# Patient Record
Sex: FEMALE
[De-identification: names, ages, dates, MRNs, and addresses within clinical notes are randomized per-mention and may not be internally consistent; named-entity substitution may affect disease eponyms.]

---

## 2019-10-30 ENCOUNTER — HOSPITAL ENCOUNTER (OUTPATIENT)
Dept: HOSPITAL 76 - LAB.WCP | Age: 44
Discharge: HOME | End: 2019-10-30
Attending: NURSE PRACTITIONER
Payer: COMMERCIAL

## 2019-10-30 DIAGNOSIS — R60.9: Primary | ICD-10-CM

## 2019-10-30 LAB
ALBUMIN DIAFP-MCNC: 3.7 G/DL (ref 3.2–5.5)
ALBUMIN/GLOB SERPL: 1.1 {RATIO} (ref 1–2.2)
ALP SERPL-CCNC: 51 IU/L (ref 42–121)
ALT SERPL W P-5'-P-CCNC: 14 IU/L (ref 10–60)
ANION GAP SERPL CALCULATED.4IONS-SCNC: 8 MMOL/L (ref 6–13)
AST SERPL W P-5'-P-CCNC: 18 IU/L (ref 10–42)
BASOPHILS NFR BLD AUTO: 0.1 10^3/UL (ref 0–0.1)
BASOPHILS NFR BLD AUTO: 1 %
BILIRUB BLD-MCNC: 0.5 MG/DL (ref 0.2–1)
BUN SERPL-MCNC: 7 MG/DL (ref 6–20)
CALCIUM UR-MCNC: 8.6 MG/DL (ref 8.5–10.3)
CHLORIDE SERPL-SCNC: 106 MMOL/L (ref 101–111)
CO2 SERPL-SCNC: 25 MMOL/L (ref 21–32)
CREAT SERPLBLD-SCNC: 0.8 MG/DL (ref 0.4–1)
EOSINOPHIL # BLD AUTO: 0.1 10^3/UL (ref 0–0.7)
EOSINOPHIL NFR BLD AUTO: 2 %
ERYTHROCYTE [DISTWIDTH] IN BLOOD BY AUTOMATED COUNT: 19.4 % (ref 12–15)
EST. AVERAGE GLUCOSE BLD GHB EST-MCNC: 114 MG/DL (ref 70–100)
GFRSERPLBLD MDRD-ARVRAT: 78 ML/MIN/{1.73_M2} (ref 89–?)
GLOBULIN SER-MCNC: 3.3 G/DL (ref 2.1–4.2)
GLUCOSE SERPL-MCNC: 81 MG/DL (ref 70–100)
HB2 TOTAL: 11.1 G/DL
HBA1C BLD-MCNC: 0.42 G/DL
HEMOGLOBIN A1C %: 5.6 % (ref 4.6–6.2)
HGB UR QL STRIP: 10.4 G/DL (ref 12–16)
LYMPHOCYTES # SPEC AUTO: 2.4 10^3/UL (ref 1.5–3.5)
LYMPHOCYTES NFR BLD AUTO: 49.7 %
MCH RBC QN AUTO: 23.1 PG (ref 27–31)
MCHC RBC AUTO-ENTMCNC: 29.5 G/DL (ref 32–36)
MCV RBC AUTO: 78.2 FL (ref 81–99)
MONOCYTES # BLD AUTO: 0.3 10^3/UL (ref 0–1)
MONOCYTES NFR BLD AUTO: 5.1 %
NEUTROPHILS # BLD AUTO: 2.1 10^3/UL (ref 1.5–6.6)
NEUTROPHILS # SNV AUTO: 4.9 X10^3/UL (ref 4.8–10.8)
NEUTROPHILS NFR BLD AUTO: 42 %
PDW BLD AUTO: 10 FL (ref 7.9–10.8)
PLATELET # BLD: 377 10^3/UL (ref 130–450)
PROT SPEC-MCNC: 7 G/DL (ref 6.7–8.2)
RBC MAR: 4.5 10^6/UL (ref 4.2–5.4)
SODIUM SERPLBLD-SCNC: 139 MMOL/L (ref 135–145)

## 2019-10-30 PROCEDURE — 80053 COMPREHEN METABOLIC PANEL: CPT

## 2019-10-30 PROCEDURE — 85025 COMPLETE CBC W/AUTO DIFF WBC: CPT

## 2019-10-30 PROCEDURE — 36415 COLL VENOUS BLD VENIPUNCTURE: CPT

## 2019-10-30 PROCEDURE — 83036 HEMOGLOBIN GLYCOSYLATED A1C: CPT

## 2019-10-31 ENCOUNTER — HOSPITAL ENCOUNTER (OUTPATIENT)
Dept: HOSPITAL 76 - DI.N | Age: 44
Discharge: HOME | End: 2019-10-31
Attending: GENERAL ACUTE CARE HOSPITAL
Payer: COMMERCIAL

## 2019-10-31 DIAGNOSIS — Z12.31: Primary | ICD-10-CM

## 2019-10-31 PROCEDURE — 77067 SCR MAMMO BI INCL CAD: CPT

## 2019-11-08 NOTE — MAMMOGRAPHY REPORT
Reason:  ROUTINE MAMMO

Procedure Date:  10/31/2019   

Accession Number:  [id number] / [id number]                    

Procedure:  MGN - Screening Mammo Dig Bilat CPT Code:  

 

***Final Report***

 

 

FULL RESULT:

 

 

EXAM: Screening Mammo Dig Bilat

 

DATE: 10/31/2019 3:40 PM

 

CLINICAL HISTORY:  Screening encounter. History of early menses.

 

TECHNIQUE: (B) - Bilateral  CC and MLO views were obtained.

 

COMPARISON: 4/5/2018.

 

PARENCHYMAL PATTERN: (A) - The breast(s) demonstrate(s) scattered 

fibroglandular densities.

 

FINDINGS:

There are coarse typically benign calcifications. There are no suspicious 

masses, calcifications, or areas of distortion.

 

IMPRESSION: Benign findings. BI-RADS category 2.

 

RECOMMENDATION: (ANNUAL)  - Recommend routine annual screening 

mammography.

 

BI-RADS CATEGORY: (2) - Benign Findings.

 

STANDARD QUALIFYING STATEMENTS:

1.  This examination was not reviewed with the aid of Computer-Aided 

Detection (CAD).

2.  A negative or benign  imaging report should not preclude biopsy if 

clinically suspicious findings are present.

3.  Dense breasts may obscure an underlying neoplasm.

4. This examination was reviewed without the aid of 3D breast imaging 

(tomosynthesis).

## 2019-11-10 ENCOUNTER — HOSPITAL ENCOUNTER (OUTPATIENT)
Dept: HOSPITAL 76 - DI | Age: 44
Discharge: HOME | End: 2019-11-10
Attending: NURSE PRACTITIONER
Payer: COMMERCIAL

## 2019-11-10 DIAGNOSIS — I82.811: Primary | ICD-10-CM

## 2019-11-10 PROCEDURE — 93970 EXTREMITY STUDY: CPT

## 2019-11-10 NOTE — ULTRASOUND REPORT
Reason:  BILAT LEG EDEMA

Procedure Date:  11/10/2019   

Accession Number:  [id number] / [id number]                    

Procedure:  US  - Duplex Ext Veins Bilateral CPT Code:  

 

***Final Report***

 

 

FULL RESULT:

 

 

EXAM:

BILATERAL LOWER EXTREMITY VENOUS ULTRASOUND

 

EXAM DATE: 11/10/2019 03:57 PM.

 

CLINICAL HISTORY: Intermittent bilateral leg edema x16 years.

 

COMPARISON: None.

 

TECHNIQUE: Real-time sonographic vascular imaging was performed by the 

sonographer through the lower extremities utilizing both color-flow and 

Doppler spectral analysis. Multiple representative static images were 

saved for review.

 

FINDINGS:

Right:

Common Femoral Vein (CFV): Normal.

CFV-GSV Junction: Nonocclusive focal thrombus in the proximal GSV, distal 

to the CFV-GSV junction.

Profunda Femoral Vein (PFV): Normal.

Femoral Vein (FV) Prox: Normal.

Femoral Vein (FV) Mid: Normal.

Femoral Vein (FV) Dist: Normal.

Popliteal Vein: Normal.

Posterior Tibial Veins: Normal.

Peroneal Veins: Limited visualization. No thrombus seen.

Left:

Common Femoral Vein (CFV): Normal.

CFV-GSV Junction: Normal.

Profunda Femoral Vein (PFV): Normal.

Femoral Vein (FV) Prox: Normal.

Femoral Vein (FV) Mid: Normal.

Femoral Vein (FV) Dist: Limited visualization. No thrombus seen.

Popliteal Vein: Normal.

Posterior Tibial Veins: Normal.

Peroneal Veins: Limited visualization. No thrombus seen.

 

Other: None.

IMPRESSION:

1. Nonocclusive superficial thrombus in the proximal right GSV.

2. No evidence for deep venous thrombosis within the visualized 

structures bilaterally.

 

[facility]

 

The critical result notification system was initiated by Dr. Amira Eason 

at 04:06 PM on 11/10/2019.

 

The above critical result findings  were discussed with WARREN Hogue by Dr. Amira Eason at 04:19 PM on 11/10/2019.

## 2019-12-13 ENCOUNTER — HOSPITAL ENCOUNTER (OUTPATIENT)
Dept: HOSPITAL 76 - LAB.WCP | Age: 44
Discharge: HOME | End: 2019-12-13
Attending: NURSE PRACTITIONER
Payer: COMMERCIAL

## 2019-12-13 DIAGNOSIS — N93.9: Primary | ICD-10-CM

## 2019-12-13 LAB
BASOPHILS NFR BLD AUTO: 0.1 10^3/UL (ref 0–0.1)
BASOPHILS NFR BLD AUTO: 1.2 %
EOSINOPHIL # BLD AUTO: 0.1 10^3/UL (ref 0–0.7)
EOSINOPHIL NFR BLD AUTO: 2.3 %
ERYTHROCYTE [DISTWIDTH] IN BLOOD BY AUTOMATED COUNT: 17.6 % (ref 12–15)
HGB UR QL STRIP: 9.6 G/DL (ref 12–16)
LYMPHOCYTES # SPEC AUTO: 2 10^3/UL (ref 1.5–3.5)
LYMPHOCYTES NFR BLD AUTO: 45.3 %
MCH RBC QN AUTO: 23.4 PG (ref 27–31)
MCHC RBC AUTO-ENTMCNC: 29.4 G/DL (ref 32–36)
MCV RBC AUTO: 79.5 FL (ref 81–99)
MONOCYTES # BLD AUTO: 0.2 10^3/UL (ref 0–1)
MONOCYTES NFR BLD AUTO: 4.8 %
NEUTROPHILS # BLD AUTO: 2 10^3/UL (ref 1.5–6.6)
NEUTROPHILS # SNV AUTO: 4.3 X10^3/UL (ref 4.8–10.8)
NEUTROPHILS NFR BLD AUTO: 46.2 %
PDW BLD AUTO: 9.7 FL (ref 7.9–10.8)
PLATELET # BLD: 396 10^3/UL (ref 130–450)
RBC MAR: 4.1 10^6/UL (ref 4.2–5.4)

## 2019-12-13 PROCEDURE — 36415 COLL VENOUS BLD VENIPUNCTURE: CPT

## 2019-12-13 PROCEDURE — 85025 COMPLETE CBC W/AUTO DIFF WBC: CPT

## 2020-01-15 ENCOUNTER — HOSPITAL ENCOUNTER (OUTPATIENT)
Dept: HOSPITAL 76 - LAB.WCP | Age: 45
Discharge: HOME | End: 2020-01-15
Attending: NURSE PRACTITIONER
Payer: COMMERCIAL

## 2020-01-15 DIAGNOSIS — D50.9: ICD-10-CM

## 2020-01-15 DIAGNOSIS — N93.9: Primary | ICD-10-CM

## 2020-01-15 LAB
BASOPHILS NFR BLD AUTO: 0.1 10^3/UL (ref 0–0.1)
BASOPHILS NFR BLD AUTO: 1.3 %
EOSINOPHIL # BLD AUTO: 0.1 10^3/UL (ref 0–0.7)
EOSINOPHIL NFR BLD AUTO: 2.5 %
ERYTHROCYTE [DISTWIDTH] IN BLOOD BY AUTOMATED COUNT: 16.9 % (ref 12–15)
HGB UR QL STRIP: 9.6 G/DL (ref 12–16)
LYMPHOCYTES # SPEC AUTO: 2.1 10^3/UL (ref 1.5–3.5)
LYMPHOCYTES NFR BLD AUTO: 52.3 %
MCH RBC QN AUTO: 22.4 PG (ref 27–31)
MCHC RBC AUTO-ENTMCNC: 28.4 G/DL (ref 32–36)
MCV RBC AUTO: 79 FL (ref 81–99)
MONOCYTES # BLD AUTO: 0.3 10^3/UL (ref 0–1)
MONOCYTES NFR BLD AUTO: 6.3 %
NEUTROPHILS # BLD AUTO: 1.5 10^3/UL (ref 1.5–6.6)
NEUTROPHILS # SNV AUTO: 3.9 X10^3/UL (ref 4.8–10.8)
NEUTROPHILS NFR BLD AUTO: 37.3 %
PDW BLD AUTO: 10.1 FL (ref 7.9–10.8)
PLAT MORPH BLD: (no result)
PLATELET # BLD: 414 10^3/UL (ref 130–450)
PLATELET BLD QL SMEAR: (no result)
RBC MAR: 4.28 10^6/UL (ref 4.2–5.4)
RBC MORPH BLD: (no result)

## 2020-01-15 PROCEDURE — 36415 COLL VENOUS BLD VENIPUNCTURE: CPT

## 2020-01-15 PROCEDURE — 85025 COMPLETE CBC W/AUTO DIFF WBC: CPT

## 2020-02-17 ENCOUNTER — HOSPITAL ENCOUNTER (OUTPATIENT)
Dept: HOSPITAL 76 - DI | Age: 45
Discharge: HOME | End: 2020-02-17
Attending: NURSE PRACTITIONER
Payer: COMMERCIAL

## 2020-02-17 DIAGNOSIS — I82.4Y1: Primary | ICD-10-CM

## 2020-02-20 NOTE — ULTRASOUND REPORT
Reason:  ACUTE EMBOLISM OF DEEP VEINS OF RT PROXIMAL LE

Procedure Date:  02/17/2020   

Accession Number:  [id number] / [id number]                    

Procedure:  US  - Duplex Ext Veins Right CPT Code:  

 

***Final Report***

 

 

FULL RESULT:

 

 

EXAM:

RIGHT LOWER EXTREMITY VENOUS ULTRASOUND

 

EXAM DATE: 2/17/2020 06:27 PM.

 

CLINICAL HISTORY: ACUTE EMBOLISM OF DEEP VEINS OF RT PROXIMAL LE.

 

COMPARISON: DUPLEX EXT VEINS BILATERAL 11/10/2019 2:19 PM.

 

TECHNIQUE: Real-time sonographic vascular imaging was performed by the 

sonographer through the lower extremity utilizing both color-flow and 

Doppler spectral analysis. Multiple representative static images were 

saved for review.

 

FINDINGS:

Common Femoral Vein (CFV): Normal.

CFV-GSV Junction: The previously seen nonocclusive focal thrombus in the 

proximal GSV distal to the CFV-GSV junction is not present on today's 

exam. The distal GSV is small in caliber.

Profunda Femoral Vein (PFV): Normal.

Femoral Vein (FV) Prox: Normal.

Femoral Vein (FV) Mid: Normal.

Femoral Vein (FV) Dist: Normal.

Popliteal Vein: Normal.

Posterior Tibial Veins: Limited visualization. No evidence of thrombus.

Peroneal Veins: Limited visualization. No evidence of thrombus.

 

Other: None.

IMPRESSION: No evidence for deep venous thrombosis.

Resolved thrombus in the proximal greater saphenous vein.

 

[facility]

## 2020-03-12 ENCOUNTER — HOSPITAL ENCOUNTER (OUTPATIENT)
Dept: HOSPITAL 76 - LAB | Age: 45
Discharge: HOME | End: 2020-03-12
Attending: NURSE PRACTITIONER
Payer: COMMERCIAL

## 2020-03-12 DIAGNOSIS — I82.4Y1: Primary | ICD-10-CM

## 2020-03-12 LAB
INR PPP: 1.1 (ref 0.8–1.2)
PROTHROM ACT/NOR PPP: 12.9 SECS (ref 9.9–12.6)

## 2020-03-12 PROCEDURE — 81599 UNLISTED MAAA: CPT

## 2020-03-12 PROCEDURE — 85610 PROTHROMBIN TIME: CPT

## 2020-03-12 PROCEDURE — 85302 CLOT INHIBIT PROT C ANTIGEN: CPT

## 2020-03-12 PROCEDURE — 81241 F5 GENE: CPT

## 2020-03-12 PROCEDURE — 85303 CLOT INHIBIT PROT C ACTIVITY: CPT

## 2020-03-12 PROCEDURE — 36415 COLL VENOUS BLD VENIPUNCTURE: CPT

## 2020-03-12 PROCEDURE — 85305 CLOT INHIBIT PROT S TOTAL: CPT

## 2020-03-12 PROCEDURE — 85306 CLOT INHIBIT PROT S FREE: CPT
